# Patient Record
Sex: FEMALE | Race: WHITE | NOT HISPANIC OR LATINO | Employment: STUDENT | URBAN - METROPOLITAN AREA
[De-identification: names, ages, dates, MRNs, and addresses within clinical notes are randomized per-mention and may not be internally consistent; named-entity substitution may affect disease eponyms.]

---
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
## 2019-07-23 RX ORDER — CETIRIZINE HYDROCHLORIDE 10 MG/1
1 TABLET ORAL DAILY
COMMUNITY
Start: 2015-04-20 | End: 2019-07-25 | Stop reason: ALTCHOICE

## 2019-07-23 RX ORDER — IBUPROFEN 800 MG/1
TABLET ORAL
COMMUNITY
End: 2019-07-25 | Stop reason: ALTCHOICE

## 2019-07-25 ENCOUNTER — OFFICE VISIT (OUTPATIENT)
Dept: INTERNAL MEDICINE CLINIC | Facility: CLINIC | Age: 19
End: 2019-07-25

## 2019-07-25 VITALS
HEART RATE: 72 BPM | SYSTOLIC BLOOD PRESSURE: 100 MMHG | TEMPERATURE: 97.7 F | BODY MASS INDEX: 27.7 KG/M2 | WEIGHT: 162.26 LBS | HEIGHT: 64 IN | DIASTOLIC BLOOD PRESSURE: 80 MMHG

## 2019-07-25 DIAGNOSIS — E78.00 ELEVATED LDL CHOLESTEROL LEVEL: Primary | Chronic | ICD-10-CM

## 2019-07-25 DIAGNOSIS — J30.1 SEASONAL ALLERGIC RHINITIS DUE TO POLLEN: ICD-10-CM

## 2019-07-25 DIAGNOSIS — E66.3 OVERWEIGHT (BMI 25.0-29.9): Chronic | ICD-10-CM

## 2019-07-25 PROCEDURE — 3725F SCREEN DEPRESSION PERFORMED: CPT | Performed by: INTERNAL MEDICINE

## 2019-07-25 PROCEDURE — 99385 PREV VISIT NEW AGE 18-39: CPT | Performed by: INTERNAL MEDICINE

## 2019-07-25 NOTE — PROGRESS NOTES
Assessment/Plan:  s per our discussion I have given you a script to get labs completed  This is a for the labs in the past that showed you did have a slightly elevated cholesterol level as well as strong family history of thyroid disease  We will contact you with the results  Also reported to make sure you are getting enough protein with Vegan diet and I have included additional information for you regarding this  You will be due for your tetanus booster shot in 1 year, flu vaccine this fall  You will also be due to start routine gyn and Pap smears at age 23  No problem-specific Assessment & Plan notes found for this encounter  Diagnoses and all orders for this visit:    Elevated LDL cholesterol level  -     Comprehensive metabolic panel  -     Lipid Panel with Direct LDL reflex    Seasonal allergic rhinitis due to pollen    Overweight (BMI 25 0-29 9)  -     TSH, 3rd generation with Free T4 reflex    Other orders  -     Discontinue: cetirizine (ZyrTEC) 10 mg tablet; Take 1 tablet by mouth daily  -     Discontinue: norelgestromin-ethinyl estradiol Jerl Mangle) 150-35 MCG/24HR; Place 1 patch on the skin once a week  -     Discontinue: ibuprofen (MOTRIN) 800 mg tablet; Take by mouth          Subjective:      Patient ID: Chao Muse is a 20 y o  female  New patient to establish care  Known ADEOLA but has not taken any allergy meds, took the local honey and seemed to help  Also no longer on birth control    Will be due to start PAP age 23    Immunizations UTD will be due for Td in 1 year    Currently in school at [employer] for early childhood education  Will be starting at [employer] this fall  Patient reports that she is Vegan although every once in a while she will have eggs  Discussed the importance of maintaining healthy protein with this type of diet  Patient does not do any organized exercise but she does try to stay active      Did discuss with patient that her weight is just above normal and while the gin is healthy and she is not eating a lot of greasy fried foods we still 1 also make sure that we are having healthy calories to maintain healthy weight  Review of records shows that patient had negative HIV testing however it also showed that she did have slightly elevated cholesterol of 207 and an LDL of 143 on previous labs  Patient reports that she has been Vegan for the past 1 year and prior to that she was having processed foods an meats higher in cholesterol  Reviewed the we will get new labs to re-evaluate  The following portions of the patient's history were reviewed and updated as appropriate: allergies, current medications, past family history, past medical history, past social history, past surgical history and problem list     Review of Systems   Constitutional: Negative  Negative for activity change, chills, fever and unexpected weight change  HENT: Negative  Negative for congestion, rhinorrhea and trouble swallowing  Last dental 1 week ago told all good   Eyes: Negative  Negative for pain, itching and visual disturbance  No glasses   Respiratory: Negative for cough, chest tightness and shortness of breath  No asthma   Cardiovascular: Negative  Negative for chest pain, palpitations and leg swelling  Gastrointestinal: Negative  Negative for abdominal pain, constipation and diarrhea  Endocrine: Negative  Negative for cold intolerance, heat intolerance, polydipsia, polyphagia and polyuria  Genitourinary: Negative for dysuria, frequency and menstrual problem  Periods last 4 days  LMP 7/23/19   Musculoskeletal: Negative  Fx a small toe once, nothing else   Skin: Negative  Negative for rash  Allergic/Immunologic: Positive for environmental allergies  Negative for food allergies  Neurological: Negative for dizziness, seizures, syncope, weakness and headaches  Psychiatric/Behavioral: Negative  Negative for sleep disturbance  Objective:      /80 (BP Location: Left arm, Patient Position: Sitting, Cuff Size: Standard)   Pulse 72   Temp 97 7 °F (36 5 °C) (Oral)   Ht 5' 3 75" (1 619 m)   Wt 73 6 kg (162 lb 4 1 oz)   BMI 28 07 kg/m²          Physical Exam   Constitutional: She is oriented to person, place, and time  She appears well-developed and well-nourished  No distress  HENT:   Head: Normocephalic and atraumatic  Right Ear: External ear normal    Left Ear: External ear normal    Eyes: Pupils are equal, round, and reactive to light  Conjunctivae and EOM are normal    Neck: Normal range of motion  Neck supple  No thyromegaly present  Cardiovascular: Normal rate, regular rhythm and normal heart sounds  No murmur heard  Pulmonary/Chest: Effort normal and breath sounds normal  No respiratory distress  She has no wheezes  Abdominal: Soft  Bowel sounds are normal  There is no tenderness  Musculoskeletal: Normal range of motion  She exhibits no edema or tenderness  Neurological: She is oriented to person, place, and time  No cranial nerve deficit  She exhibits normal muscle tone  Skin: Skin is warm and dry  No rash noted  Psychiatric: She has a normal mood and affect  Her behavior is normal  Judgment and thought content normal    Nursing note and vitals reviewed        PHQ-9 Depression Screening    PHQ-9:    Frequency of the following problems over the past two weeks:       Little interest or pleasure in doing things:  0 - not at all  Feeling down, depressed, or hopeless:  0 - not at all  PHQ-2 Score:  0

## 2019-07-25 NOTE — PATIENT INSTRUCTIONS
As per our discussion I have given you a script to get labs completed  This is a for the labs in the past that showed you did have a slightly elevated cholesterol level as well as strong family history of thyroid disease  We will contact you with the results  Also reported to make sure you are getting enough protein with Vegan diet and I have included additional information for you regarding this  You will be due for your tetanus booster shot in 1 year, flu vaccine this fall  You will also be due to start routine gyn and Pap smears at age 23  High Protein Diet   WHAT YOU NEED TO KNOW:   What is a high-protein diet? A high-protein diet is a meal plan that includes extra protein  Your body may need extra protein if you have certain health conditions, such as cancer, burns, or ulcers  You may also need to follow this diet to get stronger after a surgery or illness  Extra protein helps to heal wounds and form new tissue in the body  Your dietitian will tell you how much protein and how many calories you need each day  What are some foods that are high in protein? The average amount of protein is listed below in grams (g)  To find the exact amount of protein in a food, read the food labels on packaged items    · Dairy:      ¨ 1 cup of any type of milk (8 g)    ¨ ½ cup of evaporated canned milk (9 g)    ¨ ¼ cup of nonfat dry milk (11 g)    ¨ 1 ounce of semihard or solid cheese (7 g)    ¨ ¼ cup of parmesan cheese (8 g)    ¨ ½ cup of cottage cheese (14 g)    ¨ ½ cup of pudding (4 g)    ¨ 1 cup of plain or fruit yogurt (8 g)    · Meats and meat substitutes:      ¨ 3 ounces of cooked freshwater fish (21 g)     ¨ 3 ounces of cooked shellfish (19 g)    ¨ ½ cup of canned tuna (14 g)    ¨ 3 ounces of cooked chicken, turkey, or other poultry (24 g)    ¨ 3 ounces of cooked beef, pork, lamb, or other red meat (21 g)    ¨ 1 large egg (6 g)    ¨ ¼ cup of fat free egg substitute (5 g)    ¨ ½ cup of tofu or tempeh (10 g)    ¨ 1 cup of cooked dried beans, such as moerno, kidney, or navy (15 g)    · Nuts and seeds:      ¨ 2 tablespoons of almonds, cashews, sunflower seeds, or walnuts (5 g)    ¨ 2 tablespoons of peanuts (7 g)    ¨ 2 tablespoons of peanut butter (8 g)  How can I add extra protein to foods? · Add powdered milk to milk, cereals, scrambled eggs, soups, and casseroles  · Add cheese to sauces, soups, or vegetables  · Add eggs to tuna, salads, sauces, or casseroles  · Add nutrition supplements and breakfast drink mixes to milk or shakes  · Add nuts to foods or eat them as snacks  · Add meat (beef, chicken, and pork) to soups, casseroles, pasta dishes, or vegetables  · Add beans, peas, and other legumes to salads  · Eat cottage cheese or yogurt with fruit  CARE AGREEMENT:   You have the right to help plan your care  Discuss treatment options with your caregivers to decide what care you want to receive  You always have the right to refuse treatment  The above information is an  only  It is not intended as medical advice for individual conditions or treatments  Talk to your doctor, nurse or pharmacist before following any medical regimen to see if it is safe and effective for you  © 2017 [facility] Information is for End User's use only and may not be sold, redistributed or otherwise used for commercial purposes  All illustrations and images included in CareNotes® are the copyrighted property of A D A M [employer] Inc  or [employer] Le

## 2019-08-20 ENCOUNTER — HOSPITAL ENCOUNTER (EMERGENCY)
Facility: HOSPITAL | Age: 19
Discharge: HOME | End: 2019-08-20
Attending: EMERGENCY MEDICINE
Payer: COMMERCIAL

## 2019-08-20 VITALS
DIASTOLIC BLOOD PRESSURE: 64 MMHG | SYSTOLIC BLOOD PRESSURE: 107 MMHG | OXYGEN SATURATION: 98 % | TEMPERATURE: 98.1 F | RESPIRATION RATE: 18 BRPM | HEART RATE: 81 BPM

## 2019-08-20 DIAGNOSIS — L60.0 INGROWN NAIL OF GREAT TOE OF LEFT FOOT: Primary | ICD-10-CM

## 2019-08-20 PROCEDURE — 99284 EMERGENCY DEPT VISIT MOD MDM: CPT | Performed by: PHYSICIAN ASSISTANT

## 2019-08-20 PROCEDURE — 99282 EMERGENCY DEPT VISIT SF MDM: CPT

## 2019-08-20 RX ORDER — CEPHALEXIN 500 MG/1
500 CAPSULE ORAL 3 TIMES DAILY
Qty: 30 CAPSULE | Refills: 0 | Status: SHIPPED | OUTPATIENT
Start: 2019-08-20 | End: 2019-08-30

## 2019-08-20 NOTE — ED PROVIDER NOTES
History  Chief Complaint   Patient presents with    Nail Problem     pt has ingrown toenail removed and states that it is now red and tender  69-year-old female presents emergency room for evaluation left great toe pain and swelling  States a few weeks ago she had it cut down by the pedicure women after having the start of an ingrown toenail  States prior to this she had a fungal infection of the toenail which was also cut down  Patient states she tried to cut this side corner of it down further because of this development of swelling and pain  Admits to some drainage which was yellow within the past week  Denies generalized toe swelling  States it is localized to the left side of the nail  History provided by:  Patient      None       History reviewed  No pertinent past medical history  Past Surgical History:   Procedure Laterality Date    WISDOM TOOTH EXTRACTION         Family History   Problem Relation Age of Onset    Diabetes Mother     Hypothyroidism Mother     Diabetes Father     Hyperlipidemia Father     Hypertension Father     Hypothyroidism Father     Skin cancer Father      I have reviewed and agree with the history as documented  Social History     Tobacco Use    Smoking status: Never Smoker    Smokeless tobacco: Never Used   Substance Use Topics    Alcohol use: Never     Frequency: Never    Drug use: Never        Review of Systems   Constitutional: Negative for chills and fever  Musculoskeletal: Positive for joint swelling  Skin: Positive for rash and wound  Neurological: Negative for weakness and numbness  Physical Exam  Physical Exam   Constitutional: She appears well-developed and well-nourished  Cardiovascular: Intact distal pulses  Musculoskeletal:        Feet:    Skin: Skin is warm and dry  Nursing note and vitals reviewed        Vital Signs  ED Triage Vitals [08/20/19 1227]   Temperature Pulse Respirations Blood Pressure SpO2   98 1 °F (36 7 °C) 81 18 107/64 98 %      Temp Source Heart Rate Source Patient Position - Orthostatic VS BP Location FiO2 (%)   Oral Monitor Lying Right arm --      Pain Score       --           Vitals:    08/20/19 1227   BP: 107/64   Pulse: 81   Patient Position - Orthostatic VS: Lying         Visual Acuity      ED Medications  Medications - No data to display    Diagnostic Studies  Results Reviewed     None                 No orders to display              Procedures  Procedures       ED Course                               MDM  Number of Diagnoses or Management Options  Ingrown nail of great toe of left foot:   Risk of Complications, Morbidity, and/or Mortality  General comments: Advised patient to the soak the toe in warm water couple times a day advised her to stop cutting away at the nail and see a podiatrist   Take oral antibiotics  Return to emergency room if worse  Patient Progress  Patient progress: stable      Disposition  Final diagnoses:   Ingrown nail of great toe of left foot     Time reflects when diagnosis was documented in both MDM as applicable and the Disposition within this note     Time User Action Codes Description Comment    8/20/2019 12:33 PM Terence HASTINGS Add [L60 0] Ingrown nail of great toe of left foot       ED Disposition     ED Disposition Condition Date/Time Comment    Discharge Stable Tue Aug 20, 2019 12:33 PM Bita Crowley discharge to home/self care              Follow-up Information     Follow up With Specialties Details Why Contact Info Additional Information    Kacey Car DPM Podiatry, Wound Care In 3 days  [address] [place], [place] [id number] [phone]       [address] Emergency Department Emergency Medicine  If symptoms worsen [address]  [phone]  ED, [address] [place] [address], [phone]          Discharge Medication List as of 8/20/2019 12:35 PM      START taking these medications    Details cephalexin (KEFLEX) 500 mg capsule Take 1 capsule (500 mg total) by mouth 3 (three) times a day for 10 days, Starting Tue 8/20/2019, Until Fri 8/30/2019, Normal           No discharge procedures on file      ED Provider  Electronically Signed by           Satinder Haskins PA-C  08/21/19 9560

## 2019-08-21 ENCOUNTER — APPOINTMENT (OUTPATIENT)
Dept: LAB | Facility: CLINIC | Age: 19
End: 2019-08-21
Payer: COMMERCIAL

## 2019-08-21 LAB
ALBUMIN SERPL BCP-MCNC: 3.9 G/DL (ref 3.5–5)
ALP SERPL-CCNC: 103 U/L (ref 46–116)
ALT SERPL W P-5'-P-CCNC: 19 U/L (ref 12–78)
ANION GAP SERPL CALCULATED.3IONS-SCNC: 5 MMOL/L (ref 4–13)
AST SERPL W P-5'-P-CCNC: 14 U/L (ref 5–45)
BILIRUB SERPL-MCNC: 0.44 MG/DL (ref 0.2–1)
BUN SERPL-MCNC: 6 MG/DL (ref 5–25)
CALCIUM SERPL-MCNC: 8.7 MG/DL (ref 8.3–10.1)
CHLORIDE SERPL-SCNC: 105 MMOL/L (ref 100–108)
CHOLEST SERPL-MCNC: 161 MG/DL (ref 50–200)
CO2 SERPL-SCNC: 28 MMOL/L (ref 21–32)
CREAT SERPL-MCNC: 0.71 MG/DL (ref 0.6–1.3)
GFR SERPL CREATININE-BSD FRML MDRD: 123 ML/MIN/1.73SQ M
GLUCOSE P FAST SERPL-MCNC: 83 MG/DL (ref 65–99)
HDLC SERPL-MCNC: 40 MG/DL (ref 40–60)
LDLC SERPL CALC-MCNC: 99 MG/DL (ref 0–100)
POTASSIUM SERPL-SCNC: 3.9 MMOL/L (ref 3.5–5.3)
PROT SERPL-MCNC: 7.4 G/DL (ref 6.4–8.2)
SODIUM SERPL-SCNC: 138 MMOL/L (ref 136–145)
TRIGL SERPL-MCNC: 109 MG/DL
TSH SERPL DL<=0.05 MIU/L-ACNC: 1.65 UIU/ML (ref 0.46–3.98)

## 2019-08-21 PROCEDURE — 80053 COMPREHEN METABOLIC PANEL: CPT | Performed by: PHYSICIAN ASSISTANT

## 2019-08-21 PROCEDURE — 80061 LIPID PANEL: CPT | Performed by: PHYSICIAN ASSISTANT

## 2019-08-21 PROCEDURE — 84443 ASSAY THYROID STIM HORMONE: CPT | Performed by: PHYSICIAN ASSISTANT

## 2019-08-21 PROCEDURE — 36415 COLL VENOUS BLD VENIPUNCTURE: CPT | Performed by: PHYSICIAN ASSISTANT

## 2019-10-18 ENCOUNTER — OFFICE VISIT (OUTPATIENT)
Dept: PODIATRY | Facility: CLINIC | Age: 19
End: 2019-10-18
Payer: COMMERCIAL

## 2019-10-18 VITALS — SYSTOLIC BLOOD PRESSURE: 110 MMHG | DIASTOLIC BLOOD PRESSURE: 65 MMHG

## 2019-10-18 DIAGNOSIS — L60.0 INGROWN TOENAIL OF LEFT FOOT WITH INFECTION: Primary | ICD-10-CM

## 2019-10-18 PROCEDURE — 99202 OFFICE O/P NEW SF 15 MIN: CPT | Performed by: PODIATRIST

## 2019-10-18 PROCEDURE — 11730 AVULSION NAIL PLATE SIMPLE 1: CPT | Performed by: PODIATRIST

## 2019-10-18 NOTE — PATIENT INSTRUCTIONS
Partial Nail Avulsion for Ingrown Nail   AMBULATORY CARE:   What you need to know about a partial nail avulsion:  A partial nail avulsion is a procedure to remove an ingrown nail  An ingrown nail is when the edge of your fingernail or toenail grows into the skin next to it  How to prepare for a partial nail avulsion:  Your healthcare provider will talk to you about how to prepare for the procedure  He may tell you not to eat or drink anything after midnight on the day of your surgery  He will tell you what medicines to take or not take on the day of your surgery  You may need someone to drive you home and stay with you  What will happen during a partial nail avulsion:   · You will be given local anesthesia around the nail to numb the area  Your healthcare provider will cut the nail along the edge that is growing into the skin  He will remove the nail from your finger or toe  Your healthcare provider may apply a solution or small electric charge to your nail bed  This keeps the nail from growing into your skin again  · You may need a matricectomy  This is when part of your nail matrix is destroyed so a small section of your nail stops growing  Your nail matrix is the area that your nail grows from  It is the pale or white color at the base of your nail  Most of the matrix cannot be seen because it is underneath your skin  A chemical, laser, or instrument may be used to destroy the nail matrix  What will happen after a partial nail avulsion:  Your healthcare provider may put antibiotic ointment and a bandage on your finger or toe  He may want to look at your finger or toe again within 24 hours after your procedure  You may have yellowish drainage for 2 to 6 weeks after your procedure  Risks of a partial nail avulsion:  You may bleed more than expected or develop an infection  Your ingrown nail may happen again  You may have damage to surrounding tissue  Your nail may look disfigured or you may have a scar  You may develop a cyst or it make take longer than expected to heal   Seek care immediately if:   · Blood soaks through your bandage  · You have a red streak running up your leg or arm  Contact your healthcare provider if:   · You have a fever or chills  · Your wound is red, swollen, or draining pus  · Your symptoms return  · The nail is not better in 7 days  · You have questions or concerns about your condition or care  Medicines:   · Acetaminophen  decreases pain and fever  It is available without a doctor's order  Ask how much to take and how often to take it  Follow directions  Acetaminophen can cause liver damage if not taken correctly  · NSAIDs , such as ibuprofen, help decrease swelling, pain, and fever  This medicine is available with or without a doctor's order  NSAIDs can cause stomach bleeding or kidney problems in certain people  If you take blood thinner medicine, always ask your healthcare provider if NSAIDs are safe for you  Always read the medicine label and follow directions  · Take your medicine as directed  Contact your healthcare provider if you think your medicine is not helping or if you have side effects  Tell him or her if you are allergic to any medicine  Keep a list of the medicines, vitamins, and herbs you take  Include the amounts, and when and why you take them  Bring the list or the pill bottles to follow-up visits  Carry your medicine list with you in case of an emergency  Self-care:   · Soak your wound in warm water  2 times a day  Look for signs of infection such as redness, swelling, or drainage  It may take up to 4 weeks to heal     · Keep your nail area clean, dry, and covered  Put on a clean, new bandage as directed  Do not use an adhesive bandage  It may stick to the wound and cause pain when you remove it  Ask your healthcare provider what kind of bandage to use  Change your bandage when it gets wet or dirty       · Wear tennis shoes  or shoes that fit loosely for the first 2 weeks  Do not wear tight shoes or shoes with high heels  Do not run or do strenuous activity for 2 to 4 weeks  · Elevate  your hand or foot above the level of your heart as often as you can for 24 hours  This will help decrease swelling and pain  Prop your hand or foot on pillows or blankets to keep it elevated comfortably  · Ask  when you can return to work, school, or your usual sports and activities  Prevent another ingrown nail:   · Trim your nails straight across  Do not cut them too short  Do not tear your nails at the corners or pick at them  Lightly file the nail corners if you have sharp edges  Do not round your nails  Use clippers, not nail scissors  · Keep your nails clean and dry  Wash your hands and feet with soap and water  Pat dry with a clean towel  Dry in between each toe  Do not put lotion between your toes  · Inspect your nails daily  Look for signs of an ingrown nail  Manage problems early so the nail does not become infected  Follow up with your healthcare provider as directed:  Write down your questions so you remember to ask them during your visits  © 2017 [address] Information is for End User's use only and may not be sold, redistributed or otherwise used for commercial purposes  All illustrations and images included in CareNotes® are the copyrighted property of [employer]  or [employer]s Le  The above information is an  only  It is not intended as medical advice for individual conditions or treatments  Talk to your doctor, nurse or pharmacist before following any medical regimen to see if it is safe and effective for you

## 2019-10-18 NOTE — PROGRESS NOTES
Assessment/Plan:      Diagnoses and all orders for this visit:    Ingrown toenail of left foot with infection  Comments:  postop instructions given, discussed worsening infection, call if suspected  Other orders  -     Nail removal      Nail removal  Date/Time: 10/18/2019 10:16 AM  Performed by: Jose Neri DPM  Authorized by: Jose Neri DPM     Patient location:  Clinic  Consent:     Consent obtained:  Verbal    Risks discussed:  Bleeding, incomplete removal, infection, pain and permanent nail deformity  Location:     Foot:  L big toe  Pre-procedure details:     Skin preparation:  Betadine  Anesthesia (see MAR for exact dosages): Anesthesia method:  Local infiltration (3cc 1% lidocaine plain)  Nail Removal:     Nail removed:  Partial  Ingrown nail:     Nail matrix removed or ablated:  None  Post-procedure details:     Dressing:  4x4 sterile gauze, antibiotic ointment and gauze roll    Patient tolerance of procedure: Tolerated well, no immediate complications  Comments: Both medial and lateral corners removed  Subjective:     Patient ID: Yojana Story is a 20 y o  female  Patient reports ingrown nail and just finished an antibiotic  Both corners of the left great toe are red and painful  Review of Systems   Constitutional: Negative for activity change  Gastrointestinal: Negative for diarrhea and nausea  Skin: Positive for color change (ingrown nail)  Neurological: Negative for numbness  Objective:     Physical Exam   Cardiovascular:   Pulses:       Dorsalis pedis pulses are 2+ on the right side, and 2+ on the left side  Posterior tibial pulses are 2+ on the right side, and 2+ on the left side  Musculoskeletal:        Left foot: There is normal range of motion and no deformity  Feet:   Left Foot:   Protective Sensation: 10 sites tested  10 sites sensed  Skin Integrity: Positive for skin breakdown (hallux medial and lateral)

## 2021-05-03 ENCOUNTER — TELEMEDICINE (OUTPATIENT)
Dept: INTERNAL MEDICINE CLINIC | Facility: CLINIC | Age: 21
End: 2021-05-03

## 2021-05-03 DIAGNOSIS — U07.1 COVID-19 VIRUS INFECTION: Primary | ICD-10-CM

## 2021-05-03 PROCEDURE — 99212 OFFICE O/P EST SF 10 MIN: CPT | Performed by: PHYSICIAN ASSISTANT

## 2021-05-03 NOTE — PROGRESS NOTES
COVID-19 Outpatient Progress Note    Assessment/Plan:    Problem List Items Addressed This Visit     None      Visit Diagnoses     COVID-19 virus infection    -  Primary         Disposition:     I recommended continued isolation until at least 24 hours have passed since recovery defined as resolution of fever without the use of fever-reducing medications AND improvement in COVID symptoms AND 10 days have passed since onset of symptoms (or 10 days have passed since date of first positive viral diagnostic test for asymptomatic patients)  On your virtual visit today we discussed that you tested positive for COVID-19 on Saturday May 1st     You do admit that you developed symptoms on Friday April 30th and that by later that evening you are already feeling much better  You confirm as of Saturday after getting tested you no longer had any symptoms  As per our discussion today you have remains symptom free  This is good news however per CDC guidelines you still must remain under quarantine for the full 10 days  You are remaining quarantine in her room isolated from other family members at this time  We discussed that as of Monday May 10th you may resume normal activities including mask wearing and social distancing  We did discuss however that if you develop any new symptoms please contact our office  You report you do not require a letter for your [employer] at this time  I have spent 16 minutes directly with the patient  Greater than 50% of this time was spent in counseling/coordination of care regarding: prognosis, risks and benefits of treatment options, instructions for management, patient and family education, importance of treatment compliance and risk factor reductions          Encounter provider Nory Christiansen PA-C    Provider located at [facility]  [address] [facility] [address] [place] [address] [phone]    Recent Visits  No visits were found meeting these conditions  Showing recent visits within past 7 days and meeting all other requirements     Today's Visits  Date Type Provider Dept   05/03/21 Telemedicine Freya Huynh, [address] [facility] today's visits and meeting all other requirements     Future Appointments  No visits were found meeting these conditions  Showing future appointments within next 150 days and meeting all other requirements        Patient agrees to participate in a virtual check in via telephone or video visit instead of presenting to the office to address urgent/immediate medical needs  Patient is aware this is a billable service  After connecting through [facility], the patient was identified by name and date of birth  Bernardo Gardner was informed that this was a telemedicine visit and that the exam was being conducted confidentially over secure lines  My office door was closed  No one else was in the room  Bernardo Gardner acknowledged consent and understanding of privacy and security of the telemedicine visit  I informed the patient that I have reviewed her record in Epic and presented the opportunity for her to ask any questions regarding the visit today  The patient agreed to participate  Subjective:   Bernardo Gardner is a 24 y o  female who has been screened for COVID-19  Symptom change since last report: resolving  Patient is currently asymptomatic  Patient denies fever, chills, fatigue, malaise, congestion, rhinorrhea, sore throat, anosmia, loss of taste, cough, shortness of breath, chest tightness, abdominal pain, nausea, vomiting, diarrhea, myalgias and headaches  Cheri Parikh has been staying home and has isolated themselves in her home  She is taking care to not share personal items and is cleaning all surfaces that are touched often, like counters, tabletops, and doorknobs using household cleaning sprays or wipes  She is wearing a mask when she leaves her room       Date of symptom onset: 4/30/2021  Date of positive COVID-19 PCR: 5/1/2021      Patient contacted today regarding positive COVID test     Patient reports that she and her roommate at [employer] [facility] [facility] developed symptoms of headache that got worse, body aches nausea and chills on Friday April 30th and decided to get tested  Roommate loss of taste and started 4/29  Per patient went to [facility] an had positive results on May 1st     Patient was already home from [place] on 4/28/2021    States was already feeling better later Fri night  No symptoms as of Sat  Will still need to quarantine the 10 days  No results found for: Albina Driver, [address] [facility], [address] [facility], [facility] [address]  History reviewed  No pertinent past medical history  Past Surgical History:   Procedure Laterality Date    WISDOM TOOTH EXTRACTION       No current outpatient medications on file  No current facility-administered medications for this visit  No Known Allergies    Review of Systems   Constitutional: Negative for chills, fatigue and fever  HENT: Negative for congestion, rhinorrhea and sore throat  Respiratory: Negative for cough, chest tightness and shortness of breath  Gastrointestinal: Negative for abdominal pain, diarrhea, nausea and vomiting  Musculoskeletal: Negative for myalgias  Neurological: Negative for headaches  Objective: There were no vitals filed for this visit  Physical Exam  Nursing note reviewed  Constitutional:       General: She is not in acute distress  Appearance: She is not ill-appearing  Eyes:      Extraocular Movements: Extraocular movements intact  Conjunctiva/sclera: Conjunctivae normal    Pulmonary:      Effort: Pulmonary effort is normal  No respiratory distress  Neurological:      Mental Status: She is alert  Mental status is at baseline     Psychiatric:         Mood and Affect: Mood normal        VIRTUAL VISIT DISCLAIMER    Neal Andre acknowledges that she has consented to an online visit or consultation  She understands that the online visit is based solely on information provided by her, and that, in the absence of a face-to-face physical evaluation by the physician, the diagnosis she receives is both limited and provisional in terms of accuracy and completeness  This is not intended to replace a full medical face-to-face evaluation by the physician  Ryder Florez understands and accepts these terms

## 2023-03-29 ENCOUNTER — OFFICE VISIT (OUTPATIENT)
Dept: URGENT CARE | Facility: CLINIC | Age: 23
End: 2023-03-29

## 2023-03-29 VITALS
RESPIRATION RATE: 14 BRPM | OXYGEN SATURATION: 98 % | BODY MASS INDEX: 28.37 KG/M2 | WEIGHT: 164 LBS | HEART RATE: 102 BPM | DIASTOLIC BLOOD PRESSURE: 62 MMHG | SYSTOLIC BLOOD PRESSURE: 104 MMHG | TEMPERATURE: 97.7 F

## 2023-03-29 DIAGNOSIS — J02.0 STREP PHARYNGITIS: Primary | ICD-10-CM

## 2023-03-29 LAB — S PYO AG THROAT QL: POSITIVE

## 2023-03-29 RX ORDER — AMOXICILLIN 500 MG/1
500 TABLET, FILM COATED ORAL 2 TIMES DAILY
Qty: 20 TABLET | Refills: 0 | Status: SHIPPED | OUTPATIENT
Start: 2023-03-29 | End: 2023-04-08

## 2023-03-29 NOTE — PATIENT INSTRUCTIONS
Your rapid strep was positive  Take all of the antibiotics, do not stop early or your strep may return  Continue supportive care with salt water gargles, cough drops, over the counter throat sprays, and warm fluids  Follow up with PCP in 3-5 days  Proceed to  ER if symptoms worsen

## 2023-03-29 NOTE — PROGRESS NOTES
[address] [facility]        NAME: Evan Flanagan is a 24 y o  female  : 2000    MRN: [id number]  DATE: 2023  TIME: 11:33 AM    Assessment and Plan   Sore throat [J02 9]  1  Sore throat  POCT rapid strepA          Tested for strep in office today, results were positive, take all antibiotics  Continue supportive care, and educated pt on  Can try Cepacol throat spray from the pharmacy for symptoms  Patient Instructions     Your rapid strep was positive  Take all of the antibiotics, do not stop early or your strep may return  Continue supportive care with salt water gargles, cough drops, over the counter throat sprays, and warm fluids  Follow up with PCP in 3-5 days  Proceed to  ER if symptoms worsen  Chief Complaint     Chief Complaint   Patient presents with   • Fever     Started 3 nights ago 101, 102 this morning  Sore throat  Body aches  No cough  Taking tylenol for fever  History of Present Illness       Presents with sick symptoms that began 3 nights ago including fever to 101-102, sore throat and body aches  Denies cough  Taking Tylenol  Denies known sick contacts  She has been able to eat and drink  Review of Systems   Review of Systems   Constitutional: Positive for fever  Negative for chills and fatigue  HENT: Positive for sore throat  Negative for congestion  Respiratory: Negative for cough, shortness of breath and wheezing  Cardiovascular: Negative for chest pain  Gastrointestinal: Negative for abdominal pain  Genitourinary: Negative for dysuria  Musculoskeletal: Positive for myalgias  Neurological: Negative for dizziness  Psychiatric/Behavioral: Negative for confusion  Current Medications     No current outpatient medications on file      Current Allergies     Allergies as of 2023   • (No Known Allergies)            The following portions of the patient's history were reviewed and updated as appropriate: allergies, current medications, past family history, past medical history, past social history, past surgical history and problem list      History reviewed  No pertinent past medical history  Past Surgical History:   Procedure Laterality Date   • WISDOM TOOTH EXTRACTION         Family History   Problem Relation Age of Onset   • Diabetes Mother    • Hypothyroidism Mother    • Diabetes Father    • Hyperlipidemia Father    • Hypertension Father    • Hypothyroidism Father    • Skin cancer Father          Medications have been verified  Objective   /62   Pulse 102   Temp 97 7 °F (36 5 °C)   Resp 14   Wt 74 4 kg (164 lb)   SpO2 98%   BMI 28 37 kg/m²        Physical Exam     Physical Exam  Vitals reviewed  Constitutional:       General: She is not in acute distress  Appearance: Normal appearance  HENT:      Right Ear: Tympanic membrane, ear canal and external ear normal       Left Ear: Tympanic membrane, ear canal and external ear normal       Nose: Nose normal       Mouth/Throat:      Mouth: Mucous membranes are moist       Pharynx: Posterior oropharyngeal erythema present  Tonsils: Tonsillar exudate present  2+ on the right  2+ on the left  Eyes:      Conjunctiva/sclera: Conjunctivae normal    Cardiovascular:      Rate and Rhythm: Normal rate and regular rhythm  Pulses: Normal pulses  Heart sounds: Normal heart sounds  No murmur heard  Pulmonary:      Effort: Pulmonary effort is normal  No respiratory distress  Breath sounds: Normal breath sounds  Lymphadenopathy:      Cervical: Cervical adenopathy present  Skin:     General: Skin is warm and dry  Neurological:      General: No focal deficit present  Mental Status: She is alert and oriented to person, place, and time     Psychiatric:         Mood and Affect: Mood normal          Behavior: Behavior normal

## 2023-09-28 ENCOUNTER — VBI (OUTPATIENT)
Dept: ADMINISTRATIVE | Facility: OTHER | Age: 23
End: 2023-09-28

## 2025-03-26 ENCOUNTER — OFFICE VISIT (OUTPATIENT)
Dept: URGENT CARE | Age: 25
End: 2025-03-26
Payer: COMMERCIAL

## 2025-03-26 VITALS
RESPIRATION RATE: 18 BRPM | WEIGHT: 154 LBS | TEMPERATURE: 98.4 F | BODY MASS INDEX: 26.64 KG/M2 | OXYGEN SATURATION: 99 % | HEART RATE: 99 BPM | SYSTOLIC BLOOD PRESSURE: 110 MMHG | DIASTOLIC BLOOD PRESSURE: 70 MMHG

## 2025-03-26 DIAGNOSIS — J06.9 VIRAL URI WITH COUGH: ICD-10-CM

## 2025-03-26 DIAGNOSIS — B37.31 VAGINAL CANDIDIASIS: ICD-10-CM

## 2025-03-26 DIAGNOSIS — H66.93 BILATERAL OTITIS MEDIA, UNSPECIFIED OTITIS MEDIA TYPE: Primary | ICD-10-CM

## 2025-03-26 PROCEDURE — 99214 OFFICE O/P EST MOD 30 MIN: CPT | Performed by: PHYSICIAN ASSISTANT

## 2025-03-26 RX ORDER — BROMPHENIRAMINE MALEATE, PSEUDOEPHEDRINE HYDROCHLORIDE, AND DEXTROMETHORPHAN HYDROBROMIDE 2; 30; 10 MG/5ML; MG/5ML; MG/5ML
10 SYRUP ORAL 3 TIMES DAILY PRN
Qty: 120 ML | Refills: 0 | Status: SHIPPED | OUTPATIENT
Start: 2025-03-26

## 2025-03-26 RX ORDER — FLUCONAZOLE 150 MG/1
150 TABLET ORAL ONCE
Qty: 1 TABLET | Refills: 0 | Status: SHIPPED | OUTPATIENT
Start: 2025-03-26 | End: 2025-03-26

## 2025-03-26 NOTE — PATIENT INSTRUCTIONS
Take Augmentin as prescribed.  Recommend continue Tylenol and Motrin as needed for body aches and fever.  Take Bromfed as prescribed.  Take Diflucan if any signs or symptoms of yeast infection develop.  If symptoms or not improving in 2 to 3 days follow-up with PCP.  If symptoms worsen or new symptoms develop report to the emergency room.

## 2025-03-26 NOTE — PROGRESS NOTES
[facility]        NAME: Nathalie Camargo is a 25 y.o. female  : 2000    MRN: [id number]  DATE: 2025  TIME: 7:03 PM    Assessment and Plan   Bilateral otitis media, unspecified otitis media type [H66.93]  1. Bilateral otitis media, unspecified otitis media type  amoxicillin-clavulanate (AUGMENTIN) 875-125 mg per tablet      2. Viral URI with cough  brompheniramine-pseudoephedrine-DM 30-2-10 MG/5ML syrup      3. Vaginal candidiasis  fluconazole (DIFLUCAN) 150 mg tablet      With URI symptoms recommend Bromfed to treat.  She has otitis media on examination we will start on Augmentin to treat as well as this will also cover for potential lung pathogens.  Patient states that she has had vaginal yeast infections in the past though not necessarily associated antibiotics will write for Diflucan for prophylaxis.    Medical Decision Making     PROBLEM: 1 acute illness with systemic symptoms    DATA: None     RISK: Prescription drug management    TIME: 15 minutes      Patient Instructions     Patient Instructions   Take Augmentin as prescribed.  Recommend continue Tylenol and Motrin as needed for body aches and fever.  Take Bromfed as prescribed.  Take Diflucan if any signs or symptoms of yeast infection develop.  If symptoms or not improving in 2 to 3 days follow-up with PCP.  If symptoms worsen or new symptoms develop report to the emergency room.    Follow up with PCP in 3-5 days.  Proceed to  ER if symptoms worsen.    If tests have been performed at [facility], our office will contact you with results if changes need to be made to the care plan discussed with you at the visit. You can review your full results on [facility] MyChart.     Chief Complaint     Chief Complaint   Patient presents with    Fever    Cold Like Symptoms     Fever x 5 days highest 101.1 per patient   Sore throat, congestion, bilateral ear pain, fatigue and chest congestion x 2 days            History of Present Illness       25  year old female presents with complaint of fever, chills, bilateral ear pain, sore throat, sinus congestion, chest congestion, and cough x 5 days. Pt reports worsening ear pain and chest congestion x 1-2 days. T max 101.3.     Fever  Associated symptoms include chills, congestion, coughing, fatigue, a fever and a sore throat. Pertinent negatives include no chest pain.       Review of Systems   Review of Systems   Constitutional:  Positive for chills, fatigue and fever.   HENT:  Positive for congestion, ear pain, postnasal drip, rhinorrhea and sore throat.    Respiratory:  Positive for cough and chest tightness. Negative for shortness of breath.    Cardiovascular:  Negative for chest pain.         Current Medications       Current Outpatient Medications:     amoxicillin-clavulanate (AUGMENTIN) 875-125 mg per tablet, Take 1 tablet by mouth every 12 (twelve) hours for 7 days, Disp: 14 tablet, Rfl: 0    brompheniramine-pseudoephedrine-DM 30-2-10 MG/5ML syrup, Take 10 mL by mouth 3 (three) times a day as needed for cough or congestion, Disp: 120 mL, Rfl: 0    fluconazole (DIFLUCAN) 150 mg tablet, Take 1 tablet (150 mg total) by mouth once for 1 dose, Disp: 1 tablet, Rfl: 0    Current Allergies     Allergies as of 03/26/2025    (No Active Allergies)            The following portions of the patient's history were reviewed and updated as appropriate: allergies, current medications, past family history, past medical history, past social history, past surgical history and problem list.     No past medical history on file.    Past Surgical History:   Procedure Laterality Date    WISDOM TOOTH EXTRACTION         Family History   Problem Relation Age of Onset    Diabetes Mother     Hypothyroidism Mother     Diabetes Father     Hyperlipidemia Father     Hypertension Father     Hypothyroidism Father     Skin cancer Father          Medications have been verified.        Objective   /70 (BP Location: Left arm, Patient Position:  Sitting, Cuff Size: Adult)   Pulse 99   Temp 98.4 °F (36.9 °C) (Tympanic)   Resp 18   Wt 69.9 kg (154 lb)   LMP 03/26/2025   SpO2 99%   BMI 26.64 kg/m²   Patient's last menstrual period was 03/26/2025.       Physical Exam     Physical Exam  Vitals and nursing note reviewed.   Constitutional:       General: She is awake. She is not in acute distress.     Appearance: Normal appearance. She is well-developed and well-groomed. She is not ill-appearing, toxic-appearing or diaphoretic.   HENT:      Head: Normocephalic and atraumatic.      Jaw: No trismus.      Right Ear: Hearing, ear canal and external ear normal. A middle ear effusion is present. There is no impacted cerumen. No foreign body. Tympanic membrane is erythematous. Tympanic membrane is not injected, scarred, perforated, retracted or bulging.      Left Ear: Hearing, ear canal and external ear normal. A middle ear effusion is present. There is no impacted cerumen. No foreign body. Tympanic membrane is erythematous. Tympanic membrane is not injected, scarred, perforated, retracted or bulging.      Nose: Mucosal edema, congestion and rhinorrhea present. No nasal deformity. Rhinorrhea is clear.      Right Nostril: No foreign body, epistaxis or occlusion.      Left Nostril: No foreign body, epistaxis or occlusion.      Right Turbinates: Not enlarged, swollen or pale.      Left Turbinates: Not enlarged, swollen or pale.      Mouth/Throat:      Lips: Pink. No lesions.      Mouth: Mucous membranes are moist. No injury, oral lesions or angioedema.      Dentition: Normal dentition.      Tongue: No lesions. Tongue does not deviate from midline.      Palate: No mass and lesions.      Pharynx: Uvula midline. Postnasal drip present. No pharyngeal swelling, oropharyngeal exudate, posterior oropharyngeal erythema or uvula swelling.      Tonsils: No tonsillar exudate or tonsillar abscesses.   Eyes:      General: Lids are normal. Vision grossly intact. Gaze aligned  "appropriately. No allergic shiner.     Extraocular Movements: Extraocular movements intact.   Cardiovascular:      Rate and Rhythm: Normal rate and regular rhythm.      Heart sounds: Normal heart sounds, S1 normal and S2 normal.   Pulmonary:      Effort: Pulmonary effort is normal.      Breath sounds: Normal breath sounds.      Comments: Patient is speaking in full sentences with no increased respiratory effort. No audible wheezing or stridor.   Musculoskeletal:      Cervical back: Normal range of motion.   Lymphadenopathy:      Cervical: Cervical adenopathy present.      Right cervical: No superficial, deep or posterior cervical adenopathy.     Left cervical: Posterior cervical adenopathy present. No superficial or deep cervical adenopathy.   Skin:     General: Skin is warm and dry.   Neurological:      Mental Status: She is alert and oriented to person, place, and time.      Coordination: Coordination is intact.      Gait: Gait is intact.   Psychiatric:         Attention and Perception: Attention and perception normal.         Mood and Affect: Mood and affect normal.         Speech: Speech normal.         Behavior: Behavior normal. Behavior is cooperative.               Note: Portions of this record may have been created with voice recognition software. Occasional wrong word or \"sound a like\" substitutions may have occurred due to the inherent limitations of voice recognition software. Please read the chart carefully and recognize, using context, where substitutions have occurred.*      "